# Patient Record
Sex: MALE | Race: BLACK OR AFRICAN AMERICAN | Employment: OTHER | URBAN - METROPOLITAN AREA
[De-identification: names, ages, dates, MRNs, and addresses within clinical notes are randomized per-mention and may not be internally consistent; named-entity substitution may affect disease eponyms.]

---

## 2017-02-22 ENCOUNTER — HOSPITAL ENCOUNTER (OUTPATIENT)
Dept: CARDIAC REHAB | Age: 67
Discharge: HOME | End: 2017-02-22
Attending: INTERNAL MEDICINE
Payer: COMMERCIAL

## 2017-02-22 VITALS — BODY MASS INDEX: 15.92 KG/M2 | HEIGHT: 70 IN | WEIGHT: 111.2 LBS

## 2017-02-22 DIAGNOSIS — J44.9 CHRONIC OBSTRUCTIVE PULMONARY DISEASE, UNSPECIFIED COPD TYPE (HCC): ICD-10-CM

## 2017-02-22 PROCEDURE — G0424 PULMONARY REHAB W EXER: HCPCS
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
## 2017-02-22 NOTE — CARDIO/PULMONARY
Mr. Magi Balbuena presented today for 6 minute walk test.  Pt. Stated should prefer to have test be performed on the track. Test was conducted via walking track around the exercise equipment where 1 lap = 90 feet. Pt. Walked 9.5 laps which equals 855 feet. Test was completed with one stop at the end of minute 4 due to oxygen saturation 88% 3L; sat the patient down to recover, took 2 minutes which brought us to the end of the test.  Lowest O2 88% 3L. RPD 5. RPE 13.       Resting HR 72  Resting /77  Resting O2 99 3L    Peak HR 88  Peak /56    Immediately After HR 79  Immediately After /56  Immediately After O2 90% 3L    5min after HR 79  5min after /56  5min after O2 100% 3L

## 2017-02-23 ENCOUNTER — HOSPITAL ENCOUNTER (OUTPATIENT)
Dept: CARDIAC REHAB | Age: 67
End: 2017-02-23
Attending: INTERNAL MEDICINE
Payer: COMMERCIAL

## 2017-02-23 NOTE — CARDIO/PULMONARY
Pt was a no show for his first exercise session. Pt was called, pt stated that he did not want to return. Pt c/o lower back discomfort, pt was encouraged to return and the staff will be glad to work with him. Pt stated that he will think about it. Will follow up with the patient.

## 2017-03-02 NOTE — CARDIO/PULMONARY
Pt was called today, to set first exercise appointment. Pt stated that he has several appointments and will call us back.

## 2022-03-07 ENCOUNTER — TELEPHONE (OUTPATIENT)
Dept: CARDIOLOGY CLINIC | Age: 72
End: 2022-03-07

## 2022-03-07 NOTE — TELEPHONE ENCOUNTER
Pharmacy is request Bumetanide 2 mg Tab  Take 1 tablet by mouth once daily for high blood pressure.       Just need to know if patient should n=be taking this because don't see medication in chart